# Patient Record
Sex: FEMALE | Race: WHITE | NOT HISPANIC OR LATINO | RURAL
[De-identification: names, ages, dates, MRNs, and addresses within clinical notes are randomized per-mention and may not be internally consistent; named-entity substitution may affect disease eponyms.]

---

## 2022-05-12 ENCOUNTER — EMERGENCY VISIT (OUTPATIENT)
Dept: RURAL CLINIC 1 | Facility: CLINIC | Age: 37
End: 2022-05-12

## 2022-05-13 ENCOUNTER — FOLLOW UP (OUTPATIENT)
Dept: RURAL CLINIC 1 | Facility: CLINIC | Age: 37
End: 2022-05-13

## 2022-05-13 DIAGNOSIS — H16.043: ICD-10-CM

## 2022-05-13 DIAGNOSIS — H10.213: ICD-10-CM

## 2022-05-13 PROCEDURE — 99213 OFFICE O/P EST LOW 20 MIN: CPT

## 2022-05-13 ASSESSMENT — VISUAL ACUITY
OD_CC: 20/70
OS_CC: 20/200
OD_PH: 20/20

## 2022-05-13 NOTE — PATIENT DISCUSSION
Suspect hypoxic etiology given sleeping in CLs.   Start INVELTYS BID OU.  Ok to continue PROLENSA BID OS.

## 2024-05-31 ENCOUNTER — CONTACT LENSES/GLASSES VISIT (OUTPATIENT)
Dept: RURAL CLINIC 2 | Facility: CLINIC | Age: 39
End: 2024-05-31

## 2024-05-31 DIAGNOSIS — H52.223: ICD-10-CM

## 2024-05-31 DIAGNOSIS — H52.13: ICD-10-CM

## 2024-05-31 PROCEDURE — 92015 DETERMINE REFRACTIVE STATE: CPT | Mod: NC
